# Patient Record
Sex: FEMALE | Race: BLACK OR AFRICAN AMERICAN | NOT HISPANIC OR LATINO | URBAN - METROPOLITAN AREA
[De-identification: names, ages, dates, MRNs, and addresses within clinical notes are randomized per-mention and may not be internally consistent; named-entity substitution may affect disease eponyms.]

---

## 2018-09-14 PROBLEM — 275978004 SCREENING FOR MALIGNANT NEOPLASM OF COLON: Status: ACTIVE | Noted: 2018-09-14

## 2018-09-14 PROBLEM — 271832001 FLATULENCE, ERUCTATION AND GAS PAIN: Status: ACTIVE | Noted: 2018-09-14

## 2019-03-27 PROBLEM — 2901004 MELENA: Status: ACTIVE | Noted: 2019-03-27

## 2022-04-30 ENCOUNTER — TELEPHONE ENCOUNTER (OUTPATIENT)
Dept: URBAN - METROPOLITAN AREA CLINIC 121 | Facility: CLINIC | Age: 52
End: 2022-04-30

## 2022-05-01 ENCOUNTER — TELEPHONE ENCOUNTER (OUTPATIENT)
Dept: URBAN - METROPOLITAN AREA CLINIC 121 | Facility: CLINIC | Age: 52
End: 2022-05-01

## 2022-05-01 RX ORDER — OMEPRAZOLE 40 MG/1
QD BEFORE A MEAL CAPSULE, DELAYED RELEASE ORAL
Status: ACTIVE | COMMUNITY
Start: 2018-09-14

## 2022-05-01 RX ORDER — MECLIZINE HYDROCHLORIDE 12.5 MG/1
AS NEEDED TABLET ORAL
Status: ACTIVE | COMMUNITY
Start: 2018-09-14

## 2022-05-01 RX ORDER — VALACYCLOVIR HYDROCHLORIDE 500 MG/1
1 PO Q WEEK TABLET, FILM COATED ORAL
Status: ACTIVE | COMMUNITY
Start: 2018-09-14

## 2022-05-01 RX ORDER — ALPRAZOLAM 0.25 MG/1
TID TABLET ORAL
Status: ACTIVE | COMMUNITY
Start: 2018-09-14

## 2022-05-01 RX ORDER — TRAMADOL HYDROCHLORIDE 50 MG/1
1 Q6H FOR PAIN TABLET, FILM COATED ORAL
Status: ACTIVE | COMMUNITY
Start: 2018-09-14

## 2022-05-01 RX ORDER — NUT.TX.IMPAIRED DIGESTIVE FXN 0.035-1/ML
DRINK 1 BOTTLE QID DURING COLON PREP LIQUID (ML) ORAL
Status: ACTIVE | COMMUNITY
Start: 2018-09-14

## 2022-05-01 RX ORDER — TAMOXIFEN CITRATE 20 MG/1
QD TABLET, FILM COATED ORAL
Status: ACTIVE | COMMUNITY
Start: 2018-09-14

## 2022-05-01 RX ORDER — NAPROXEN 500 MG/1
BID WITH FOOD TABLET, DELAYED RELEASE ORAL
Status: ACTIVE | COMMUNITY
Start: 2018-09-14

## 2022-05-01 RX ORDER — SODIUM SULFATE, POTASSIUM SULFATE, MAGNESIUM SULFATE 17.5; 3.13; 1.6 G/ML; G/ML; G/ML
MIX AND USE AS DIRECTED SOLUTION, CONCENTRATE ORAL
Status: ACTIVE | COMMUNITY
Start: 2018-09-14

## 2022-05-01 RX ORDER — DEXAMETHASONE 4 MG/1
5 TABS PO 12H PRIOR RO CHEMO TABLET ORAL
Status: ACTIVE | COMMUNITY
Start: 2018-09-14

## 2022-05-01 RX ORDER — LISINOPRIL 20 MG/1
QD TABLET ORAL
Status: ACTIVE | COMMUNITY
Start: 2018-09-14

## 2023-05-19 ENCOUNTER — WEB ENCOUNTER (OUTPATIENT)
Dept: URBAN - METROPOLITAN AREA CLINIC 29 | Facility: CLINIC | Age: 53
End: 2023-05-19

## 2023-05-19 ENCOUNTER — OFFICE VISIT (OUTPATIENT)
Dept: URBAN - METROPOLITAN AREA CLINIC 29 | Facility: CLINIC | Age: 53
End: 2023-05-19
Payer: COMMERCIAL

## 2023-05-19 VITALS
SYSTOLIC BLOOD PRESSURE: 144 MMHG | WEIGHT: 269 LBS | HEIGHT: 64 IN | BODY MASS INDEX: 45.93 KG/M2 | HEART RATE: 68 BPM | DIASTOLIC BLOOD PRESSURE: 96 MMHG

## 2023-05-19 DIAGNOSIS — K64.9 HEMORRHOIDS, UNSPECIFIED HEMORRHOID TYPE: ICD-10-CM

## 2023-05-19 DIAGNOSIS — K92.1 HEMATOCHEZIA: ICD-10-CM

## 2023-05-19 PROCEDURE — 99204 OFFICE O/P NEW MOD 45 MIN: CPT | Performed by: INTERNAL MEDICINE

## 2023-05-19 RX ORDER — OMEPRAZOLE 40 MG/1
QD BEFORE A MEAL CAPSULE, DELAYED RELEASE ORAL
Status: ACTIVE | COMMUNITY
Start: 2018-09-14

## 2023-05-19 RX ORDER — LISINOPRIL 20 MG/1
QD TABLET ORAL
Status: ACTIVE | COMMUNITY
Start: 2018-09-14

## 2023-05-19 RX ORDER — SODIUM, POTASSIUM,MAG SULFATES 17.5-3.13G
177ML SOLUTION, RECONSTITUTED, ORAL ORAL
Qty: 1 KIT | Refills: 0 | OUTPATIENT
Start: 2023-05-19 | End: 2023-05-20

## 2023-05-19 RX ORDER — DEXAMETHASONE 4 MG/1
5 TABS PO 12H PRIOR RO CHEMO TABLET ORAL
Status: ACTIVE | COMMUNITY
Start: 2018-09-14

## 2023-05-19 RX ORDER — NUT.TX.IMPAIRED DIGESTIVE FXN 0.035-1/ML
DRINK 1 BOTTLE QID DURING COLON PREP LIQUID (ML) ORAL
Status: ACTIVE | COMMUNITY
Start: 2018-09-14

## 2023-05-19 RX ORDER — ALPRAZOLAM 0.25 MG/1
TID TABLET ORAL
Status: ACTIVE | COMMUNITY
Start: 2018-09-14

## 2023-05-19 RX ORDER — VALACYCLOVIR HYDROCHLORIDE 500 MG/1
1 PO Q WEEK TABLET, FILM COATED ORAL
Status: ACTIVE | COMMUNITY
Start: 2018-09-14

## 2023-05-19 RX ORDER — MECLIZINE HYDROCHLORIDE 12.5 MG/1
AS NEEDED TABLET ORAL
Status: ACTIVE | COMMUNITY
Start: 2018-09-14

## 2023-05-19 RX ORDER — TRAMADOL HYDROCHLORIDE 50 MG/1
1 Q6H FOR PAIN TABLET, FILM COATED ORAL
Status: ACTIVE | COMMUNITY
Start: 2018-09-14

## 2023-05-19 RX ORDER — TAMOXIFEN CITRATE 20 MG/1
QD TABLET, FILM COATED ORAL
Status: ACTIVE | COMMUNITY
Start: 2018-09-14

## 2023-05-19 RX ORDER — NAPROXEN 500 MG/1
BID WITH FOOD TABLET, DELAYED RELEASE ORAL
Status: ACTIVE | COMMUNITY
Start: 2018-09-14

## 2023-05-19 RX ORDER — SODIUM SULFATE, POTASSIUM SULFATE, MAGNESIUM SULFATE 17.5; 3.13; 1.6 G/ML; G/ML; G/ML
MIX AND USE AS DIRECTED SOLUTION, CONCENTRATE ORAL
Status: ACTIVE | COMMUNITY
Start: 2018-09-14

## 2023-05-19 NOTE — HPI-TODAY'S VISIT:
Ms. Fu is a 53-year-old woman who is here for evaluation of hemorrhoids and change of bowel  habits.  She has also been having symptoms itching, leakage and bright red blood on the tissue.  SHe has daily bowel movements with occasional straining. Her last colonoscopy 2018 was significant only for hemorrhoids.

## 2023-05-26 ENCOUNTER — LAB OUTSIDE AN ENCOUNTER (OUTPATIENT)
Dept: URBAN - METROPOLITAN AREA CLINIC 29 | Facility: CLINIC | Age: 53
End: 2023-05-26

## 2023-06-29 ENCOUNTER — CLAIMS CREATED FROM THE CLAIM WINDOW (OUTPATIENT)
Dept: URBAN - METROPOLITAN AREA SURGERY CENTER 7 | Facility: SURGERY CENTER | Age: 53
End: 2023-06-29
Payer: COMMERCIAL

## 2023-06-29 ENCOUNTER — WEB ENCOUNTER (OUTPATIENT)
Dept: URBAN - METROPOLITAN AREA CLINIC 27 | Facility: CLINIC | Age: 53
End: 2023-06-29

## 2023-06-29 DIAGNOSIS — Z12.11 COLON CANCER SCREENING: ICD-10-CM

## 2023-06-29 PROCEDURE — G0121 COLON CA SCRN NOT HI RSK IND: HCPCS | Performed by: INTERNAL MEDICINE

## 2023-06-29 PROCEDURE — G8907 PT DOC NO EVENTS ON DISCHARG: HCPCS | Performed by: INTERNAL MEDICINE

## 2023-06-29 RX ORDER — DEXAMETHASONE 4 MG/1
5 TABS PO 12H PRIOR RO CHEMO TABLET ORAL
Status: ACTIVE | COMMUNITY
Start: 2018-09-14

## 2023-06-29 RX ORDER — OMEPRAZOLE 40 MG/1
QD BEFORE A MEAL CAPSULE, DELAYED RELEASE ORAL
Status: ACTIVE | COMMUNITY
Start: 2018-09-14

## 2023-06-29 RX ORDER — LISINOPRIL 20 MG/1
QD TABLET ORAL
Status: ACTIVE | COMMUNITY
Start: 2018-09-14

## 2023-06-29 RX ORDER — NAPROXEN 500 MG/1
BID WITH FOOD TABLET, DELAYED RELEASE ORAL
Status: ACTIVE | COMMUNITY
Start: 2018-09-14

## 2023-06-29 RX ORDER — TRAMADOL HYDROCHLORIDE 50 MG/1
1 Q6H FOR PAIN TABLET, FILM COATED ORAL
Status: ACTIVE | COMMUNITY
Start: 2018-09-14

## 2023-06-29 RX ORDER — TAMOXIFEN CITRATE 20 MG/1
QD TABLET, FILM COATED ORAL
Status: ACTIVE | COMMUNITY
Start: 2018-09-14

## 2023-06-29 RX ORDER — SODIUM SULFATE, POTASSIUM SULFATE, MAGNESIUM SULFATE 17.5; 3.13; 1.6 G/ML; G/ML; G/ML
MIX AND USE AS DIRECTED SOLUTION, CONCENTRATE ORAL
Status: ACTIVE | COMMUNITY
Start: 2018-09-14

## 2023-06-29 RX ORDER — VALACYCLOVIR HYDROCHLORIDE 500 MG/1
1 PO Q WEEK TABLET, FILM COATED ORAL
Status: ACTIVE | COMMUNITY
Start: 2018-09-14

## 2023-06-29 RX ORDER — ALPRAZOLAM 0.25 MG/1
TID TABLET ORAL
Status: ACTIVE | COMMUNITY
Start: 2018-09-14

## 2023-06-29 RX ORDER — MECLIZINE HYDROCHLORIDE 12.5 MG/1
AS NEEDED TABLET ORAL
Status: ACTIVE | COMMUNITY
Start: 2018-09-14

## 2023-06-29 RX ORDER — NUT.TX.IMPAIRED DIGESTIVE FXN 0.035-1/ML
DRINK 1 BOTTLE QID DURING COLON PREP LIQUID (ML) ORAL
Status: ACTIVE | COMMUNITY
Start: 2018-09-14

## 2023-07-26 ENCOUNTER — OFFICE VISIT (OUTPATIENT)
Dept: URBAN - METROPOLITAN AREA CLINIC 29 | Facility: CLINIC | Age: 53
End: 2023-07-26
Payer: COMMERCIAL

## 2023-07-26 VITALS
HEART RATE: 74 BPM | SYSTOLIC BLOOD PRESSURE: 127 MMHG | HEIGHT: 64 IN | BODY MASS INDEX: 28 KG/M2 | DIASTOLIC BLOOD PRESSURE: 88 MMHG | WEIGHT: 164 LBS

## 2023-07-26 DIAGNOSIS — K64.1 GRADE II HEMORRHOIDS: ICD-10-CM

## 2023-07-26 PROCEDURE — 46221 LIGATION OF HEMORRHOID(S): CPT | Performed by: INTERNAL MEDICINE

## 2023-07-26 RX ORDER — NUT.TX.IMPAIRED DIGESTIVE FXN 0.035-1/ML
DRINK 1 BOTTLE QID DURING COLON PREP LIQUID (ML) ORAL
Status: ACTIVE | COMMUNITY
Start: 2018-09-14

## 2023-07-26 RX ORDER — OMEPRAZOLE 40 MG/1
QD BEFORE A MEAL CAPSULE, DELAYED RELEASE ORAL
Status: ACTIVE | COMMUNITY
Start: 2018-09-14

## 2023-07-26 RX ORDER — NAPROXEN 500 MG/1
BID WITH FOOD TABLET, DELAYED RELEASE ORAL
Status: ACTIVE | COMMUNITY
Start: 2018-09-14

## 2023-07-26 RX ORDER — ALPRAZOLAM 0.25 MG/1
TID TABLET ORAL
Status: ACTIVE | COMMUNITY
Start: 2018-09-14

## 2023-07-26 RX ORDER — VALACYCLOVIR HYDROCHLORIDE 500 MG/1
1 PO Q WEEK TABLET, FILM COATED ORAL
Status: ACTIVE | COMMUNITY
Start: 2018-09-14

## 2023-07-26 RX ORDER — MECLIZINE HYDROCHLORIDE 12.5 MG/1
AS NEEDED TABLET ORAL
Status: ACTIVE | COMMUNITY
Start: 2018-09-14

## 2023-07-26 RX ORDER — TRAMADOL HYDROCHLORIDE 50 MG/1
1 Q6H FOR PAIN TABLET, FILM COATED ORAL
Status: ACTIVE | COMMUNITY
Start: 2018-09-14

## 2023-07-26 RX ORDER — TAMOXIFEN CITRATE 20 MG/1
QD TABLET, FILM COATED ORAL
Status: ACTIVE | COMMUNITY
Start: 2018-09-14

## 2023-07-26 RX ORDER — DEXAMETHASONE 4 MG/1
5 TABS PO 12H PRIOR RO CHEMO TABLET ORAL
Status: ACTIVE | COMMUNITY
Start: 2018-09-14

## 2023-07-26 RX ORDER — SODIUM SULFATE, POTASSIUM SULFATE, MAGNESIUM SULFATE 17.5; 3.13; 1.6 G/ML; G/ML; G/ML
MIX AND USE AS DIRECTED SOLUTION, CONCENTRATE ORAL
Status: ACTIVE | COMMUNITY
Start: 2018-09-14

## 2023-07-26 RX ORDER — LISINOPRIL 20 MG/1
QD TABLET ORAL
Status: ACTIVE | COMMUNITY
Start: 2018-09-14

## 2023-08-09 ENCOUNTER — OFFICE VISIT (OUTPATIENT)
Dept: URBAN - METROPOLITAN AREA CLINIC 29 | Facility: CLINIC | Age: 53
End: 2023-08-09
Payer: COMMERCIAL

## 2023-08-09 VITALS
HEIGHT: 64 IN | SYSTOLIC BLOOD PRESSURE: 108 MMHG | DIASTOLIC BLOOD PRESSURE: 79 MMHG | HEART RATE: 69 BPM | WEIGHT: 263 LBS | BODY MASS INDEX: 44.9 KG/M2

## 2023-08-09 DIAGNOSIS — K64.1 GRADE II HEMORRHOIDS: ICD-10-CM

## 2023-08-09 PROCEDURE — 46221 LIGATION OF HEMORRHOID(S): CPT | Performed by: INTERNAL MEDICINE

## 2023-08-09 RX ORDER — LISINOPRIL 20 MG/1
QD TABLET ORAL
Status: ACTIVE | COMMUNITY
Start: 2018-09-14

## 2023-08-09 RX ORDER — NUT.TX.IMPAIRED DIGESTIVE FXN 0.035-1/ML
DRINK 1 BOTTLE QID DURING COLON PREP LIQUID (ML) ORAL
Status: ACTIVE | COMMUNITY
Start: 2018-09-14

## 2023-08-09 RX ORDER — ALPRAZOLAM 0.25 MG/1
TID TABLET ORAL
Status: ACTIVE | COMMUNITY
Start: 2018-09-14

## 2023-08-09 RX ORDER — VALACYCLOVIR HYDROCHLORIDE 500 MG/1
1 PO Q WEEK TABLET, FILM COATED ORAL
Status: ACTIVE | COMMUNITY
Start: 2018-09-14

## 2023-08-09 RX ORDER — OMEPRAZOLE 40 MG/1
QD BEFORE A MEAL CAPSULE, DELAYED RELEASE ORAL
Status: ACTIVE | COMMUNITY
Start: 2018-09-14

## 2023-08-09 RX ORDER — TRAMADOL HYDROCHLORIDE 50 MG/1
1 Q6H FOR PAIN TABLET, FILM COATED ORAL
Status: ACTIVE | COMMUNITY
Start: 2018-09-14

## 2023-08-09 RX ORDER — DEXAMETHASONE 4 MG/1
5 TABS PO 12H PRIOR RO CHEMO TABLET ORAL
Status: ACTIVE | COMMUNITY
Start: 2018-09-14

## 2023-08-09 RX ORDER — NAPROXEN 500 MG/1
BID WITH FOOD TABLET, DELAYED RELEASE ORAL
Status: ACTIVE | COMMUNITY
Start: 2018-09-14

## 2023-08-09 RX ORDER — TAMOXIFEN CITRATE 20 MG/1
QD TABLET, FILM COATED ORAL
Status: ACTIVE | COMMUNITY
Start: 2018-09-14

## 2023-08-09 RX ORDER — SODIUM SULFATE, POTASSIUM SULFATE, MAGNESIUM SULFATE 17.5; 3.13; 1.6 G/ML; G/ML; G/ML
MIX AND USE AS DIRECTED SOLUTION, CONCENTRATE ORAL
Status: ACTIVE | COMMUNITY
Start: 2018-09-14

## 2023-08-09 RX ORDER — MECLIZINE HYDROCHLORIDE 12.5 MG/1
AS NEEDED TABLET ORAL
Status: ACTIVE | COMMUNITY
Start: 2018-09-14

## 2023-08-23 ENCOUNTER — OFFICE VISIT (OUTPATIENT)
Dept: URBAN - METROPOLITAN AREA CLINIC 29 | Facility: CLINIC | Age: 53
End: 2023-08-23
Payer: COMMERCIAL

## 2023-08-23 VITALS
WEIGHT: 262 LBS | HEIGHT: 64 IN | DIASTOLIC BLOOD PRESSURE: 86 MMHG | HEART RATE: 70 BPM | BODY MASS INDEX: 44.73 KG/M2 | SYSTOLIC BLOOD PRESSURE: 121 MMHG

## 2023-08-23 DIAGNOSIS — K64.1 GRADE II HEMORRHOIDS: ICD-10-CM

## 2023-08-23 PROCEDURE — 46221 LIGATION OF HEMORRHOID(S): CPT | Performed by: INTERNAL MEDICINE

## 2023-08-23 RX ORDER — LISINOPRIL 20 MG/1
QD TABLET ORAL
COMMUNITY
Start: 2018-09-14

## 2023-08-23 RX ORDER — DEXAMETHASONE 4 MG/1
5 TABS PO 12H PRIOR RO CHEMO TABLET ORAL
COMMUNITY
Start: 2018-09-14

## 2023-08-23 RX ORDER — NAPROXEN 500 MG/1
BID WITH FOOD TABLET, DELAYED RELEASE ORAL
COMMUNITY
Start: 2018-09-14

## 2023-08-23 RX ORDER — ALPRAZOLAM 0.25 MG/1
TID TABLET ORAL
COMMUNITY
Start: 2018-09-14

## 2023-08-23 RX ORDER — SODIUM SULFATE, POTASSIUM SULFATE, MAGNESIUM SULFATE 17.5; 3.13; 1.6 G/ML; G/ML; G/ML
MIX AND USE AS DIRECTED SOLUTION, CONCENTRATE ORAL
COMMUNITY
Start: 2018-09-14

## 2023-08-23 RX ORDER — MECLIZINE HYDROCHLORIDE 12.5 MG/1
AS NEEDED TABLET ORAL
COMMUNITY
Start: 2018-09-14

## 2023-08-23 RX ORDER — OMEPRAZOLE 40 MG/1
QD BEFORE A MEAL CAPSULE, DELAYED RELEASE ORAL
COMMUNITY
Start: 2018-09-14

## 2023-08-23 RX ORDER — TRAMADOL HYDROCHLORIDE 50 MG/1
1 Q6H FOR PAIN TABLET, FILM COATED ORAL
COMMUNITY
Start: 2018-09-14

## 2023-08-23 RX ORDER — NUT.TX.IMPAIRED DIGESTIVE FXN 0.035-1/ML
DRINK 1 BOTTLE QID DURING COLON PREP LIQUID (ML) ORAL
COMMUNITY
Start: 2018-09-14

## 2023-08-23 RX ORDER — TAMOXIFEN CITRATE 20 MG/1
QD TABLET, FILM COATED ORAL
COMMUNITY
Start: 2018-09-14

## 2023-08-23 RX ORDER — VALACYCLOVIR HYDROCHLORIDE 500 MG/1
1 PO Q WEEK TABLET, FILM COATED ORAL
COMMUNITY
Start: 2018-09-14

## 2023-10-10 ENCOUNTER — OFFICE VISIT (OUTPATIENT)
Dept: URBAN - METROPOLITAN AREA TELEHEALTH 2 | Facility: TELEHEALTH | Age: 53
End: 2023-10-10

## 2024-05-20 ENCOUNTER — OFFICE VISIT (OUTPATIENT)
Dept: URBAN - METROPOLITAN AREA CLINIC 29 | Facility: CLINIC | Age: 54
End: 2024-05-20

## 2024-05-20 ENCOUNTER — DASHBOARD ENCOUNTERS (OUTPATIENT)
Age: 54
End: 2024-05-20

## 2024-05-20 VITALS
HEART RATE: 78 BPM | DIASTOLIC BLOOD PRESSURE: 89 MMHG | SYSTOLIC BLOOD PRESSURE: 132 MMHG | BODY MASS INDEX: 44.9 KG/M2 | WEIGHT: 263 LBS | HEIGHT: 64 IN

## 2024-05-20 RX ORDER — OMEPRAZOLE 40 MG/1
QD BEFORE A MEAL CAPSULE, DELAYED RELEASE ORAL
COMMUNITY
Start: 2018-09-14

## 2024-05-20 RX ORDER — MECLIZINE HYDROCHLORIDE 12.5 MG/1
AS NEEDED TABLET ORAL
COMMUNITY
Start: 2018-09-14

## 2024-05-20 RX ORDER — LINACLOTIDE 145 UG/1
1 CAPSULE AT LEAST 30 MINUTES BEFORE THE FIRST MEAL OF THE DAY ON AN EMPTY STOMACH CAPSULE, GELATIN COATED ORAL ONCE A DAY
Qty: 30 | Refills: 3 | OUTPATIENT
Start: 2024-05-20 | End: 2024-09-17

## 2024-05-20 RX ORDER — SODIUM SULFATE, POTASSIUM SULFATE, MAGNESIUM SULFATE 17.5; 3.13; 1.6 G/ML; G/ML; G/ML
MIX AND USE AS DIRECTED SOLUTION, CONCENTRATE ORAL
COMMUNITY
Start: 2018-09-14

## 2024-05-20 RX ORDER — DEXAMETHASONE 4 MG/1
5 TABS PO 12H PRIOR RO CHEMO TABLET ORAL
COMMUNITY
Start: 2018-09-14

## 2024-05-20 RX ORDER — VALACYCLOVIR HYDROCHLORIDE 500 MG/1
1 PO Q WEEK TABLET, FILM COATED ORAL
COMMUNITY
Start: 2018-09-14

## 2024-05-20 RX ORDER — TRAMADOL HYDROCHLORIDE 50 MG/1
1 Q6H FOR PAIN TABLET, FILM COATED ORAL
COMMUNITY
Start: 2018-09-14

## 2024-05-20 RX ORDER — NAPROXEN 500 MG/1
BID WITH FOOD TABLET, DELAYED RELEASE ORAL
COMMUNITY
Start: 2018-09-14

## 2024-05-20 RX ORDER — LISINOPRIL 20 MG/1
QD TABLET ORAL
COMMUNITY
Start: 2018-09-14

## 2024-05-20 RX ORDER — TAMOXIFEN CITRATE 20 MG/1
QD TABLET, FILM COATED ORAL
COMMUNITY
Start: 2018-09-14

## 2024-05-20 RX ORDER — ALPRAZOLAM 0.25 MG/1
TID TABLET ORAL
COMMUNITY
Start: 2018-09-14

## 2024-05-20 RX ORDER — NUT.TX.IMPAIRED DIGESTIVE FXN 0.035-1/ML
DRINK 1 BOTTLE QID DURING COLON PREP LIQUID (ML) ORAL
COMMUNITY
Start: 2018-09-14

## 2025-06-16 ENCOUNTER — ERX REFILL RESPONSE (OUTPATIENT)
Dept: URBAN - METROPOLITAN AREA CLINIC 29 | Facility: CLINIC | Age: 55
End: 2025-06-16

## 2025-06-16 RX ORDER — LINACLOTIDE 145 UG/1
TAKE 1 CAPSULE BY MOUTH EVERY DAY 30 MINUTES BEFORE THE FIRST MEAL OF THE DAY ON EMPTY STOMACH CAPSULE, GELATIN COATED ORAL
Qty: 30 CAPSULE | Refills: 3 | OUTPATIENT